# Patient Record
Sex: FEMALE | URBAN - METROPOLITAN AREA
[De-identification: names, ages, dates, MRNs, and addresses within clinical notes are randomized per-mention and may not be internally consistent; named-entity substitution may affect disease eponyms.]

---

## 2023-12-15 ENCOUNTER — TELEPHONE (OUTPATIENT)
Dept: ADMINISTRATIVE | Age: 53
End: 2023-12-15

## 2023-12-15 NOTE — TELEPHONE ENCOUNTER
Pt called to schedule NP appt w/Dr John; declined waiting until Sept; Ref from KACIE Luna- CNP for Nontoxic goiter, unspecified - please advise if she can be seen sooner.  Call [phone]

## 2024-02-20 ENCOUNTER — OFFICE VISIT (OUTPATIENT)
Dept: ENDOCRINOLOGY | Age: 54
End: 2024-02-20
Payer: COMMERCIAL

## 2024-02-20 VITALS
OXYGEN SATURATION: 99 % | WEIGHT: 205 LBS | BODY MASS INDEX: 32.95 KG/M2 | HEIGHT: 66 IN | RESPIRATION RATE: 18 BRPM | SYSTOLIC BLOOD PRESSURE: 152 MMHG | DIASTOLIC BLOOD PRESSURE: 82 MMHG | HEART RATE: 71 BPM

## 2024-02-20 DIAGNOSIS — E06.9 THYROIDITIS: ICD-10-CM

## 2024-02-20 DIAGNOSIS — E01.0 THYROMEGALY: Primary | ICD-10-CM

## 2024-02-20 PROCEDURE — 99204 OFFICE O/P NEW MOD 45 MIN: CPT | Performed by: INTERNAL MEDICINE

## 2024-02-20 NOTE — PROGRESS NOTES
Cadrdiopulomary: No CP, SOB or palpitation, No orthopnea or PND. No cough or wheezing.  GI: No N/V/D, no constipation, No abdominal pain, no melena or hematochezia   : Denied any dysuria, hematuria, flank pain, discharge, or incontinence.   Skin: denied any rash, ulcer, Hirsute, or hyperpigmentation.   MSK: denied any joint deformity, joint pain/swelling, muscle pain, or back pain.  Neuro: no numbess, no tingling, no weakness,     OBJECTIVE    BP (!) 152/82   Pulse 71   Resp 18   Ht 1.676 m (5' 6\")   Wt 93 kg (205 lb)   SpO2 99%   BMI 33.09 kg/m²   BP Readings from Last 4 Encounters:   02/20/24 (!) 152/82     Wt Readings from Last 6 Encounters:   02/20/24 93 kg (205 lb)       Physical examination:  General: awake alert, oriented x3, no abnormal position or movements.   HEENT: normocephalic non traumatic  Neck: supple, no LN enlargement, +  thyromegaly, no thyroid tenderness, no JVD.  Pulm: Clear equal air entry no added sounds, no wheezing or rhonchi    CVS: S1 + S2, no murmur, no heave. Dorsalis pedis pulse palpable   Abd: soft lax, no tenderness, no organomegaly, audible bowel sounds.  Skin: warm, no lesions, no rash.  Musculoskeletal: No back tenderness, no kyphosis/scoliosis   Neuro: CN intact, sensation normal , muscle power normal.  Psych: normal mood, and affect    Review of Laboratory Data:  I have reviewed the following:  No results found for: \"WBC\", \"RBC\", \"HGB\", \"HCT\", \"MCV\", \"MCH\", \"MCHC\", \"RDW\", \"PLT\", \"MPV\", \"GRANULOCYTES\", \"NEIL\", \"NEUT\", \"BANDS\"   No results found for: \"NA\", \"K\", \"CHLORIDE\", \"CO2\", \"BUN\", \"CREATININE\", \"CALCIUM\", \"LABGLOM\", \"GFRAA\"   No results found for: \"TSH\", \"T4FREE\", \"P2DZKIF\", \"FT3\", \"E7AKNQS\", \"TSI\", \"TPOABS\", \"THGAB\"  No results found for: \"LABA1C\", \"GLUCOSE\", \"MALBCR\", \"LABCREA\"  No results found for: \"TRIG\", \"HDL\", \"LDLCALC\", \"CHOL\"  No results found for: \"VITD25\"    ASSESSMENT & RECOMMENDATIONS   Lakesha Steven, a 53 y.o.-old female seen in for following issues

## 2024-03-07 ENCOUNTER — HOSPITAL ENCOUNTER (OUTPATIENT)
Dept: ULTRASOUND IMAGING | Age: 54
Discharge: HOME | End: 2024-03-09
Attending: INTERNAL MEDICINE
Payer: COMMERCIAL

## 2024-03-07 DIAGNOSIS — E01.0 THYROMEGALY: ICD-10-CM

## 2024-03-07 DIAGNOSIS — E06.9 THYROIDITIS: ICD-10-CM

## 2024-03-07 PROCEDURE — 76536 US EXAM OF HEAD AND NECK: CPT

## 2024-03-08 ENCOUNTER — HOSPITAL ENCOUNTER (OUTPATIENT)
Age: 54
Discharge: HOME | End: 2024-03-08
Payer: COMMERCIAL

## 2024-03-08 DIAGNOSIS — E01.0 THYROMEGALY: ICD-10-CM

## 2024-03-08 DIAGNOSIS — E06.9 THYROIDITIS: ICD-10-CM

## 2024-03-08 LAB
T4 FREE SERPL-MCNC: 0.9 NG/DL (ref 0.9–1.7)
T4 SERPL-MCNC: 5.2 UG/DL (ref 4.5–11.7)
TSH SERPL DL<=0.05 MIU/L-ACNC: 5.63 UIU/ML (ref 0.27–4.2)

## 2024-03-08 PROCEDURE — 84443 ASSAY THYROID STIM HORMONE: CPT

## 2024-03-08 PROCEDURE — 84445 ASSAY OF TSI GLOBULIN: CPT

## 2024-03-08 PROCEDURE — 86376 MICROSOMAL ANTIBODY EACH: CPT

## 2024-03-08 PROCEDURE — 84439 ASSAY OF FREE THYROXINE: CPT

## 2024-03-08 PROCEDURE — 86800 THYROGLOBULIN ANTIBODY: CPT

## 2024-03-08 PROCEDURE — 36415 COLL VENOUS BLD VENIPUNCTURE: CPT

## 2024-03-10 ENCOUNTER — TELEPHONE (OUTPATIENT)
Dept: ENDOCRINOLOGY | Age: 54
End: 2024-03-10

## 2024-03-10 DIAGNOSIS — E03.9 HYPOTHYROIDISM, UNSPECIFIED TYPE: Primary | ICD-10-CM

## 2024-03-10 RX ORDER — LEVOTHYROXINE SODIUM 0.03 MG/1
25 TABLET ORAL DAILY
Qty: 30 TABLET | Refills: 5 | Status: SHIPPED | OUTPATIENT
Start: 2024-03-10

## 2024-03-10 NOTE — TELEPHONE ENCOUNTER
Notify patient  Thyroid hormones are low.  I recommend starting a small dose of thyroid medication Synthroid 25 mcg daily.  Recheck thyroid labs few days before your next visit in late May    Please take the thyroid medication in the morning on an empty stomach, waiting one hour before eating, avoiding multivitamins containing calcium or iron with it

## 2024-03-11 LAB
THYROGLOBULIN AB: 51 IU/ML (ref 0–40)
THYROID STIMULATING IMMUNOGLOB: <0.1 IU/L
THYROPEROXIDASE AB SERPL IA-ACNC: 89 IU/ML (ref 0–25)

## 2024-05-20 ENCOUNTER — HOSPITAL ENCOUNTER (OUTPATIENT)
Age: 54
Discharge: HOME | End: 2024-05-20
Payer: COMMERCIAL

## 2024-05-20 DIAGNOSIS — E03.9 HYPOTHYROIDISM, UNSPECIFIED TYPE: ICD-10-CM

## 2024-05-20 LAB
T4 FREE SERPL-MCNC: 1 NG/DL (ref 0.9–1.7)
TSH SERPL DL<=0.05 MIU/L-ACNC: 1.33 UIU/ML (ref 0.27–4.2)

## 2024-05-20 PROCEDURE — 36415 COLL VENOUS BLD VENIPUNCTURE: CPT

## 2024-05-20 PROCEDURE — 84439 ASSAY OF FREE THYROXINE: CPT

## 2024-05-20 PROCEDURE — 84443 ASSAY THYROID STIM HORMONE: CPT

## 2024-05-30 ENCOUNTER — OFFICE VISIT (OUTPATIENT)
Dept: ENDOCRINOLOGY | Age: 54
End: 2024-05-30
Payer: COMMERCIAL

## 2024-05-30 VITALS
RESPIRATION RATE: 18 BRPM | BODY MASS INDEX: 32.78 KG/M2 | SYSTOLIC BLOOD PRESSURE: 133 MMHG | DIASTOLIC BLOOD PRESSURE: 81 MMHG | HEART RATE: 74 BPM | WEIGHT: 204 LBS | HEIGHT: 66 IN | OXYGEN SATURATION: 99 %

## 2024-05-30 DIAGNOSIS — E03.9 HYPOTHYROIDISM, UNSPECIFIED TYPE: ICD-10-CM

## 2024-05-30 DIAGNOSIS — E04.2 MULTINODULAR GOITER: Primary | ICD-10-CM

## 2024-05-30 PROCEDURE — 99214 OFFICE O/P EST MOD 30 MIN: CPT | Performed by: INTERNAL MEDICINE

## 2024-05-30 RX ORDER — LEVOTHYROXINE SODIUM 0.03 MG/1
25 TABLET ORAL DAILY
Qty: 90 TABLET | Refills: 3 | Status: SHIPPED | OUTPATIENT
Start: 2024-05-30

## 2024-05-30 NOTE — PROGRESS NOTES
[facility] [employer] [facility] Department of Endocrinology Diabetes and Metabolism   [address] [place] [id number]   Phone: [phone]  Fax: [phone]    Date of Service: 5/30/2024  Primary Care Physician: Temo Rdz MD  Referring physician: No ref. provider found  Provider: Tony John MD        Reason for the visit:  Hashimoto's hypothyroidism    History of Present Illness:  The history is provided by the patient. No  was used. Accuracy of the patient data is excellent.         Lakesha Harris is a very pleasant 54 y.o. female seen today for management of hashimoto's thyroiditis and thyromegaly    The patient reports history of thyroid goiter for many years.  She never had an issue with thyroid hormones and never required any treatment in the past.   The patient reports compression symptoms including breathing and swallowing problems.  The patient denies any family history of goiter but reports positive family history of Graves' hyperthyroidism and her son.    The patient is currently on levothyroxine 25 mcg daily and feels good on it    Most recent blood work from May 20, 2024 showed a very good TFTs as summarized below  Lab Results   Component Value Date/Time    TSH 1.33 05/20/2024 04:07 PM    T4FREE 1.0 05/20/2024 04:07 PM    C3MSSTQ 5.2 03/08/2024 09:12 AM         PAST MEDICAL HISTORY   No past medical history on file.    PAST SURGICAL HISTORY   No past surgical history on file.    SOCIAL HISTORY   Tobacco:   has no history on file for tobacco use.  Alcohol:   has no history on file for alcohol use.  Drugs:   has no history on file for drug use.    FAMILY HISTORY   No family history on file.    ALLERGIES AND DRUG REACTIONS   No Known Allergies    CURRENT MEDICATIONS   Current Outpatient Medications   Medication Sig Dispense Refill    levothyroxine (SYNTHROID) 25 MCG tablet Take 1 tablet by mouth daily 30 tablet 5     No current facility-administered medications